# Patient Record
Sex: FEMALE | Race: AMERICAN INDIAN OR ALASKA NATIVE
[De-identification: names, ages, dates, MRNs, and addresses within clinical notes are randomized per-mention and may not be internally consistent; named-entity substitution may affect disease eponyms.]

---

## 2018-05-21 ENCOUNTER — HOSPITAL ENCOUNTER (EMERGENCY)
Dept: HOSPITAL 5 - ED | Age: 38
LOS: 1 days | Discharge: ANOTHER HOSPITAL | End: 2018-05-22
Payer: COMMERCIAL
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
## 2018-05-21 DIAGNOSIS — F17.200: ICD-10-CM

## 2018-05-21 DIAGNOSIS — F33.2: ICD-10-CM

## 2018-05-21 DIAGNOSIS — T36.0X2A: Primary | ICD-10-CM

## 2018-05-21 DIAGNOSIS — Z88.1: ICD-10-CM

## 2018-05-21 LAB
BACTERIA #/AREA URNS HPF: (no result) /HPF
BASOPHILS # (AUTO): 0.1 K/MM3 (ref 0–0.1)
BASOPHILS NFR BLD AUTO: 0.7 % (ref 0–1.8)
BENZODIAZEPINES SCREEN,URINE: (no result)
BILIRUB UR QL STRIP: (no result)
BLOOD UR QL VISUAL: (no result)
BUN SERPL-MCNC: 7 MG/DL (ref 7–17)
BUN/CREAT SERPL: 10 %
CALCIUM SERPL-MCNC: 8.6 MG/DL (ref 8.4–10.2)
EOSINOPHIL # BLD AUTO: 0.3 K/MM3 (ref 0–0.4)
EOSINOPHIL NFR BLD AUTO: 2.7 % (ref 0–4.3)
HCT VFR BLD CALC: 46.5 % (ref 30.3–42.9)
HEMOLYSIS INDEX: 7
HGB BLD-MCNC: 16 GM/DL (ref 10.1–14.3)
LYMPHOCYTES # BLD AUTO: 2.6 K/MM3 (ref 1.2–5.4)
LYMPHOCYTES NFR BLD AUTO: 25.9 % (ref 13.4–35)
MCH RBC QN AUTO: 31 PG (ref 28–32)
MCHC RBC AUTO-ENTMCNC: 34 % (ref 30–34)
MCV RBC AUTO: 91 FL (ref 79–97)
METHADONE SCREEN,URINE: (no result)
MONOCYTES # (AUTO): 0.5 K/MM3 (ref 0–0.8)
MONOCYTES % (AUTO): 4.9 % (ref 0–7.3)
MUCOUS THREADS #/AREA URNS HPF: (no result) /HPF
OPIATE SCREEN,URINE: (no result)
PH UR STRIP: 5 [PH] (ref 5–7)
PLATELET # BLD: 303 K/MM3 (ref 140–440)
RBC # BLD AUTO: 5.13 M/MM3 (ref 3.65–5.03)
RBC #/AREA URNS HPF: > 182 /HPF (ref 0–6)
UROBILINOGEN UR-MCNC: 2 MG/DL (ref ?–2)
WBC #/AREA URNS HPF: 42 /HPF (ref 0–6)

## 2018-05-21 PROCEDURE — 93005 ELECTROCARDIOGRAM TRACING: CPT

## 2018-05-21 PROCEDURE — 80307 DRUG TEST PRSMV CHEM ANLYZR: CPT

## 2018-05-21 PROCEDURE — 80320 DRUG SCREEN QUANTALCOHOLS: CPT

## 2018-05-21 PROCEDURE — 80048 BASIC METABOLIC PNL TOTAL CA: CPT

## 2018-05-21 PROCEDURE — 81001 URINALYSIS AUTO W/SCOPE: CPT

## 2018-05-21 PROCEDURE — 36415 COLL VENOUS BLD VENIPUNCTURE: CPT

## 2018-05-21 PROCEDURE — 85025 COMPLETE CBC W/AUTO DIFF WBC: CPT

## 2018-05-21 PROCEDURE — G0480 DRUG TEST DEF 1-7 CLASSES: HCPCS

## 2018-05-21 PROCEDURE — 84703 CHORIONIC GONADOTROPIN ASSAY: CPT

## 2018-05-21 PROCEDURE — 93010 ELECTROCARDIOGRAM REPORT: CPT

## 2018-05-21 PROCEDURE — 99285 EMERGENCY DEPT VISIT HI MDM: CPT

## 2018-05-21 NOTE — EMERGENCY DEPARTMENT REPORT
ED Psych HPI





- General


Chief Complaint: Medical Clearance


Stated Complaint: GORDO EVAL


Time Seen by Provider: 05/21/18 10:39


Source: patient, EMS


Mode of arrival: Stretcher





- History of Present Illness


Initial Comments: 


The patient is a 32 year old female that states that she has been depressed for 

some time but not currently under therapy.  He states that she took a total of 

20 pills or less beginning at 4:30 this morning.  They included Amoxil, Lortab, 

ibuprofen, tramadol and she states 4 of a unknown antidepressant which was not 

hers.  She called EMS when she began to experience itching and "hives".  She 

did not have any respiratory symptoms.  She told the nurse several minutes 

after she was here that she had substernal chest pain and so an EKG was 

performed.  At the time of my arrival the chest pain was resolved and the 

patient had no specific symptoms other than her ongoing depression.  She 

relates this to situational with her family.  She has never had a previous 

suicidal gesture.





MD Complaint: suicidal ideation, feels depressed


-: Gradual, month(s)


Associated Psychiatric Symptoms: suicidal ideation


History of same: No


Quality: intermittent


Improves With: none


Worsens With: none


Context: significant life stressor


Associated Symptoms: denies other symptoms (now asymptomatic otherwise)


Treatments Prior to Arrival: none (no psychiatric counseling)





- Related Data


 Previous Rx's











 Medication  Instructions  Recorded  Last Taken  Type


 


Amoxicillin 1,000 mg PO Q6HR #60 tablet 04/17/15 Unknown Rx


 


HYDROcodone/APAP 5-325 [[place] 1 each PO Q6HR PRN #60 tablet 04/17/15 Unknown Rx





5/325]    


 


Ibuprofen [Motrin] 600 mg PO Q8H PRN #60 tablet 06/13/15 Unknown Rx


 


traMADol [Ultram] 50 mg PO Q6HR PRN #20 tablet 06/13/15 Unknown Rx











 Allergies











Allergy/AdvReac Type Severity Reaction Status Date / Time


 


amoxicillin Allergy  Hives Verified 05/21/18 09:42














ED Review of Systems


ROS: 


Stated complaint: GORDO EVAL


Other details as noted in HPI





Constitutional: denies: chills, fever


Eyes: denies: eye pain, eye discharge, vision change


ENT: denies: ear pain, throat pain


Respiratory: denies: cough, shortness of breath, wheezing


Cardiovascular: denies: chest pain, palpitations


Endocrine: no symptoms reported


Gastrointestinal: denies: abdominal pain, nausea, diarrhea


Genitourinary: denies: urgency, dysuria, discharge


Musculoskeletal: denies: back pain, joint swelling, arthralgia


Skin: as per HPI, other (hives and itching).  denies: rash, lesions


Neurological: denies: headache, weakness, paresthesias


Psychiatric: depression, suicidal thoughts.  denies: anxiety


Hematological/Lymphatic: denies: easy bleeding, easy bruising





ED Past Medical Hx





- Past Medical History


Previous Medical History?: No


Additional medical history: Pt reports extensive family hx of Bipolar/

Schizophrenia





- Surgical History


Past Surgical History?: Yes


Additional Surgical History: tubal ligation





- Social History


Smoking Status: Current Every Day Smoker


Substance Use Type: Cocaine





- Medications


Home Medications: 


 Home Medications











 Medication  Instructions  Recorded  Confirmed  Last Taken  Type


 


Amoxicillin 1,000 mg PO Q6HR #60 tablet 04/17/15  Unknown Rx


 


HYDROcodone/APAP 5-325 [[place] 1 each PO Q6HR PRN #60 tablet 04/17/15  Unknown Rx





5/325]     


 


Ibuprofen [Motrin] 600 mg PO Q8H PRN #60 tablet 06/13/15  Unknown Rx


 


traMADol [Ultram] 50 mg PO Q6HR PRN #20 tablet 06/13/15  Unknown Rx














ED Physical Exam





- General


Limitations: No Limitations


General appearance: alert, in no apparent distress





- Head


Head exam: Present: atraumatic, normocephalic





- Eye


Eye exam: Present: normal appearance, PERRL, EOMI.  Absent: scleral icterus





- ENT


ENT exam: Present: normal orophraynx, mucous membranes moist





- Neck


Neck exam: Present: normal inspection, other (no swelling).  Absent: tenderness

, meningismus





- Respiratory


Respiratory exam: Present: normal lung sounds bilaterally.  Absent: respiratory 

distress





- Cardiovascular


Cardiovascular Exam: Present: regular rate, normal rhythm.  Absent: systolic 

murmur, diastolic murmur, rubs, gallop





- GI/Abdominal


GI/Abdominal exam: Present: soft, normal bowel sounds.  Absent: distended, 

tenderness, guarding, rebound, rigid





- Extremities Exam


Extremities exam: Present: normal inspection, full ROM, normal capillary 

refill.  Absent: tenderness, pedal edema, joint swelling, calf tenderness





- Back Exam


Back exam: Present: normal inspection





- Neurological Exam


Neurological exam: Present: alert, oriented X3, CN II-XII intact.  Absent: 

motor sensory deficit





- Psychiatric


Psychiatric exam: Present: depressed, flat affect





- Skin


Skin exam: Present: warm, dry, intact, normal color.  Absent: rash





ED Course


 Vital Signs











  05/21/18 05/21/18 05/21/18





  09:19 09:31 09:34


 


Temperature   98.0 F


 


Pulse Rate   84


 


Respiratory   18





Rate   


 


Blood Pressure 134/99 134/99 134/99


 


O2 Sat by Pulse 98 100 98





Oximetry   














  05/21/18 05/21/18 05/21/18





  10:01 10:31 11:01


 


Temperature   


 


Pulse Rate  94 H 141 H


 


Respiratory   





Rate   


 


Blood Pressure 134/99 152/106 147/110


 


O2 Sat by Pulse 97  96





Oximetry   














  05/21/18 05/21/18 05/21/18





  11:30 12:00 12:30


 


Temperature   


 


Pulse Rate  81 84


 


Respiratory   





Rate   


 


Blood Pressure 132/84 151/100 141/90


 


O2 Sat by Pulse 96 98 100





Oximetry   














- Reevaluation(s)


Reevaluation #1: 





05/21/18 13:22


Patient remains asymptomatic.  Her 12-lead EKG was normal.  I think she will be 

medically cleared.  Her psychiatric evaluation is pending.  I presume placement 

will occur.


Reevaluation #2: 


It is possible that the patient had an allergic reaction to amoxicillin.  I can'

t confirm it based on her initial exam nor excluded.  It is best that she gets 

tested before she retake a penicillin product.


05/21/18 13:24





Reevaluation #3: 


Discussed with mental health counselor.  She is aware of the executed 1013.  


05/21/18 15:23








ED Medical Decision Making





- Lab Data


Result diagrams: 


 05/21/18 10:01





 05/21/18 10:01








 Laboratory Results - last 24 hr











  05/21/18 05/21/18 05/21/18





  10:01 10:01 10:01


 


WBC   


 


RBC   


 


Hgb   


 


Hct   


 


MCV   


 


MCH   


 


MCHC   


 


RDW   


 


Plt Count   


 


Lymph % (Auto)   


 


Mono % (Auto)   


 


Eos % (Auto)   


 


Baso % (Auto)   


 


Lymph #   


 


Mono #   


 


Eos #   


 


Baso #   


 


Seg Neutrophils %   


 


Seg Neutrophils #   


 


Sodium  141  


 


Potassium  3.5 L  


 


Chloride  103.8  


 


Carbon Dioxide  23  


 


Anion Gap  18  


 


BUN  7  


 


Creatinine  0.7  


 


Estimated GFR  > 60  


 


BUN/Creatinine Ratio  10  


 


Glucose  79  


 


Calcium  8.6  


 


HCG, Qual    Negative


 


Plasma/Serum Alcohol   0.02 














  05/21/18





  10:01


 


WBC  9.9


 


RBC  5.13 H


 


Hgb  16.0 H


 


Hct  46.5 H


 


MCV  91


 


MCH  31


 


MCHC  34


 


RDW  13.4


 


Plt Count  303


 


Lymph % (Auto)  25.9


 


Mono % (Auto)  4.9


 


Eos % (Auto)  2.7


 


Baso % (Auto)  0.7


 


Lymph #  2.6


 


Mono #  0.5


 


Eos #  0.3


 


Baso #  0.1


 


Seg Neutrophils %  65.8


 


Seg Neutrophils #  6.5


 


Sodium 


 


Potassium 


 


Chloride 


 


Carbon Dioxide 


 


Anion Gap 


 


BUN 


 


Creatinine 


 


Estimated GFR 


 


BUN/Creatinine Ratio 


 


Glucose 


 


Calcium 


 


HCG, Qual 


 


Plasma/Serum Alcohol 











 Laboratory Results - last 24 hr











  05/21/18 05/21/18 05/21/18





  10:01 10:01 10:01


 


WBC   


 


RBC   


 


Hgb   


 


Hct   


 


MCV   


 


MCH   


 


MCHC   


 


RDW   


 


Plt Count   


 


Lymph % (Auto)   


 


Mono % (Auto)   


 


Eos % (Auto)   


 


Baso % (Auto)   


 


Lymph #   


 


Mono #   


 


Eos #   


 


Baso #   


 


Seg Neutrophils %   


 


Seg Neutrophils #   


 


Sodium    141


 


Potassium    3.5 L


 


Chloride    103.8


 


Carbon Dioxide    23


 


Anion Gap    18


 


BUN    7


 


Creatinine    0.7


 


Estimated GFR    > 60


 


BUN/Creatinine Ratio    10


 


Glucose    79


 


Calcium    8.6


 


HCG, Qual   


 


Salicylates  < 0.3 L  


 


Acetaminophen   < 5.0 L 


 


Plasma/Serum Alcohol   














  05/21/18 05/21/18 05/21/18





  10:01 10:01 10:01


 


WBC    9.9


 


RBC    5.13 H


 


Hgb    16.0 H


 


Hct    46.5 H


 


MCV    91


 


MCH    31


 


MCHC    34


 


RDW    13.4


 


Plt Count    303


 


Lymph % (Auto)    25.9


 


Mono % (Auto)    4.9


 


Eos % (Auto)    2.7


 


Baso % (Auto)    0.7


 


Lymph #    2.6


 


Mono #    0.5


 


Eos #    0.3


 


Baso #    0.1


 


Seg Neutrophils %    65.8


 


Seg Neutrophils #    6.5


 


Sodium   


 


Potassium   


 


Chloride   


 


Carbon Dioxide   


 


Anion Gap   


 


BUN   


 


Creatinine   


 


Estimated GFR   


 


BUN/Creatinine Ratio   


 


Glucose   


 


Calcium   


 


HCG, Qual   Negative 


 


Salicylates   


 


Acetaminophen   


 


Plasma/Serum Alcohol  0.02  














- EKG Data


-: EKG Interpreted by Me


EKG shows normal: sinus rhythm, axis, intervals, QRS complexes, ST-T waves


Rate: normal





- EKG Data


Interpretation: other (nonspecific T-wave inversions V2 V3.)


Critical care attestation.: 


If time is entered above; I have spent that time in minutes in the direct care 

of this critically ill patient, excluding procedure time.








ED Disposition


Clinical Impression: 


 Allergy to amoxicillin





Suicide gesture


Qualifiers:


 Encounter type: initial encounter Qualified Code(s): X83.8XXA - Intentional 

self-harm by other specified means, initial encounter





Depression


Qualifiers:


 Depression Type: major depressive disorder Major depression recurrence: 

recurrent Active/Remission status: currently active Major depression episode 

severity: severe Psychotic features: without psychotic features Qualified Code(s

): F33.2 - Major depressive disorder, recurrent severe without psychotic 

features





Disposition: DC/[place]-[facility]


Is pt being admited?: No


Does the pt Need Aspirin: No


Condition: Stable


Referrals: 


PRIMARY CARE,MD [Primary Care Provider] - 3-5 Days


Time of Disposition: 15:23

## 2018-05-22 VITALS — DIASTOLIC BLOOD PRESSURE: 79 MMHG | SYSTOLIC BLOOD PRESSURE: 128 MMHG

## 2018-05-22 NOTE — CONSULTATION
History of Present Illness





- Reason for Consult


Consult date: 05/22/18


Reason for consult: Mental Health Evaluation


Requesting physician: NICHOLE THOMASON





- Chief Complaint


Chief complaint: 


"I was stressed"








- History of Present Psychiatric Illness


32 year old female that states that she has been depressed for some time but 

not currently under therapy. Today the patient is calm and cooperative during 

the assessment. She stated experiencing life stressors the past 6 months. She 

stated that she became "overwhelmed" yesterday and decided to take 20 different 

pills to kill herself. She stated that she took 4 antidepressant pills that didn

't belong to her. She denies a previous suicide attempt when asked. Currently, 

she denies SI's, but rate her depression 6/10, with 10 being the worse. She 

denies any manic episodes in the past. She denies HI's and AVH's. She denies 

erratic sleep and a poor appetite. She acknowledged recreational drug use, but 

denies excessive alcohol consumption (etoh).  








Medications and Allergies


 Allergies











Allergy/AdvReac Type Severity Reaction Status Date / Time


 


amoxicillin Allergy  Hives Verified 05/21/18 09:42











 Home Medications











 Medication  Instructions  Recorded  Confirmed  Last Taken  Type


 


No Known Home Medications [No  05/21/18 05/21/18 Unknown History





Reported Home Medications]     











Active Meds: 


Active Medications





Acetaminophen (Tylenol)  650 mg PO Q4HR PRN


   PRN Reason: Pain MILD(1-3)/Fever >100.5/HA


Al Hydrox/Mg Hydrox/Simethicone (Alum-Mag Hydrox-Simeth [id number]/5ml)  30 

ml PO Q4HR PRN


   PRN Reason: Indigestion


   Last Admin: 05/21/18 22:12 Dose:  30 ml


Magnesium Hydroxide (Milk Of Magnesia)  30 ml PO Q12HR PRN


   PRN Reason: Constipation











Past psychiatric history





- Past Medical History


Past Medical History: No medical history


Past Surgical History: Other (Tubal Ligation)





- past Psychiatric treatment and history


psychiatric treatment history: 


Denies a psy hx and fam psy hx.








- Social History


Social history: lives with family





Mental Status Exam





- Vital signs


 Last Vital Signs











Temp  97.9 F   05/21/18 19:53


 


Pulse  78   05/21/18 19:53


 


Resp  18   05/21/18 19:53


 


BP  124/89   05/21/18 19:53


 


Pulse Ox  98   05/21/18 19:53














- Exam


Narrative exam: 


MSE:





 Appearance: calm, cooperative  


 Behavior: regular eye contact


 Speech: regular rate and tone


 Mood: "okay"


 Affect: normal 


 Thought Process: circumstantial           


 Thought Content: denies SI/HI's and AVH's  


 Motor Activity: ambulatory 


 Cognition: A/O x 3


 Insight: variable 


 Judgment: variable   














Results


Result Diagrams: 


 05/21/18 10:01





 05/21/18 10:01


 Abnormal lab results











  05/21/18 Range/Units





  14:00 


 


Ur Specific Gravity  1.034 H  (1.003-1.030)  


 


Urine WBC (Auto)  42.0 H  (0.0-6.0)  /HPF


 


U Epithel Cells (Auto)  15.0 H  (0-13.0)  /HPF








All other labs normal.








Assessment and Plan


Assessment and plan: 


Impression: MDD, Severe Type. Substance Use DO (cocaine/amphetamines). Today 

the patient is calm and cooperative during the assessment. Overdose on multiple 

pills. The patient minimizes her actions. QTc 418.





DDx: R/O Bipolar DO, R/O Substance Induced Mood DO





Recommendation/Plan: Continue 1013 with placement to inpatient psy services. 

Monitor the patient for serotonin syndrome. Possibly start an antidepressant in 

24 hours for depression.

## 2021-09-17 ENCOUNTER — HOSPITAL ENCOUNTER (EMERGENCY)
Dept: HOSPITAL 5 - ED | Age: 41
Discharge: HOME | End: 2021-09-17
Payer: SELF-PAY

## 2021-09-17 VITALS — DIASTOLIC BLOOD PRESSURE: 89 MMHG | SYSTOLIC BLOOD PRESSURE: 131 MMHG

## 2021-09-17 DIAGNOSIS — S16.1XXA: Primary | ICD-10-CM

## 2021-09-17 DIAGNOSIS — Z88.0: ICD-10-CM
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
## 2021-09-17 DIAGNOSIS — M62.830: ICD-10-CM

## 2021-09-17 DIAGNOSIS — Z98.890: ICD-10-CM

## 2021-09-17 DIAGNOSIS — F17.290: ICD-10-CM
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
## 2021-09-17 PROCEDURE — 99281 EMR DPT VST MAYX REQ PHY/QHP: CPT
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
